# Patient Record
Sex: MALE | Race: WHITE | NOT HISPANIC OR LATINO | URBAN - METROPOLITAN AREA
[De-identification: names, ages, dates, MRNs, and addresses within clinical notes are randomized per-mention and may not be internally consistent; named-entity substitution may affect disease eponyms.]

---

## 2018-01-04 ENCOUNTER — RECORDS - HEALTHEAST (OUTPATIENT)
Dept: LAB | Facility: CLINIC | Age: 58
End: 2018-01-04

## 2018-01-04 LAB
ALBUMIN SERPL-MCNC: 4 G/DL (ref 3.5–5)
ALP SERPL-CCNC: 109 U/L (ref 45–120)
ALT SERPL W P-5'-P-CCNC: 29 U/L (ref 0–45)
ANION GAP SERPL CALCULATED.3IONS-SCNC: 9 MMOL/L (ref 5–18)
AST SERPL W P-5'-P-CCNC: 28 U/L (ref 0–40)
BILIRUB SERPL-MCNC: 0.6 MG/DL (ref 0–1)
BUN SERPL-MCNC: 15 MG/DL (ref 8–22)
CALCIUM SERPL-MCNC: 8.9 MG/DL (ref 8.5–10.5)
CHLORIDE BLD-SCNC: 103 MMOL/L (ref 98–107)
CHOLEST SERPL-MCNC: 142 MG/DL
CO2 SERPL-SCNC: 28 MMOL/L (ref 22–31)
CREAT SERPL-MCNC: 0.85 MG/DL (ref 0.7–1.3)
FASTING STATUS PATIENT QL REPORTED: ABNORMAL
GFR SERPL CREATININE-BSD FRML MDRD: >60 ML/MIN/1.73M2
GLUCOSE BLD-MCNC: 82 MG/DL (ref 70–125)
HDLC SERPL-MCNC: 41 MG/DL
LDLC SERPL CALC-MCNC: 57 MG/DL
POTASSIUM BLD-SCNC: 4.7 MMOL/L (ref 3.5–5)
PROT SERPL-MCNC: 7.3 G/DL (ref 6–8)
PSA SERPL-MCNC: 1 NG/ML (ref 0–3.5)
SODIUM SERPL-SCNC: 140 MMOL/L (ref 136–145)
TRIGL SERPL-MCNC: 219 MG/DL

## 2020-08-04 ENCOUNTER — RECORDS - HEALTHEAST (OUTPATIENT)
Dept: LAB | Facility: CLINIC | Age: 60
End: 2020-08-04

## 2020-08-04 LAB
ALBUMIN SERPL-MCNC: 4.4 G/DL (ref 3.5–5)
ALP SERPL-CCNC: 83 U/L (ref 45–120)
ALT SERPL W P-5'-P-CCNC: 38 U/L (ref 0–45)
ANION GAP SERPL CALCULATED.3IONS-SCNC: 13 MMOL/L (ref 5–18)
AST SERPL W P-5'-P-CCNC: 34 U/L (ref 0–40)
BILIRUB SERPL-MCNC: 0.5 MG/DL (ref 0–1)
BUN SERPL-MCNC: 15 MG/DL (ref 8–22)
CALCIUM SERPL-MCNC: 9.2 MG/DL (ref 8.5–10.5)
CHLORIDE BLD-SCNC: 102 MMOL/L (ref 98–107)
CHOLEST SERPL-MCNC: 159 MG/DL
CO2 SERPL-SCNC: 24 MMOL/L (ref 22–31)
CREAT SERPL-MCNC: 0.85 MG/DL (ref 0.7–1.3)
FASTING STATUS PATIENT QL REPORTED: ABNORMAL
GFR SERPL CREATININE-BSD FRML MDRD: >60 ML/MIN/1.73M2
GLUCOSE BLD-MCNC: 91 MG/DL (ref 70–125)
HDLC SERPL-MCNC: 48 MG/DL
LDLC SERPL CALC-MCNC: 77 MG/DL
POTASSIUM BLD-SCNC: 4.5 MMOL/L (ref 3.5–5)
PROT SERPL-MCNC: 7.5 G/DL (ref 6–8)
PSA SERPL-MCNC: 1.1 NG/ML (ref 0–3.5)
SODIUM SERPL-SCNC: 139 MMOL/L (ref 136–145)
TRIGL SERPL-MCNC: 170 MG/DL

## 2020-08-20 ENCOUNTER — AMBULATORY - HEALTHEAST (OUTPATIENT)
Dept: ADMINISTRATIVE | Facility: CLINIC | Age: 60
End: 2020-08-20

## 2020-08-20 DIAGNOSIS — K40.90 RIGHT INGUINAL HERNIA: ICD-10-CM

## 2020-08-21 ENCOUNTER — AMBULATORY - HEALTHEAST (OUTPATIENT)
Dept: SURGERY | Facility: CLINIC | Age: 60
End: 2020-08-21

## 2020-08-21 ENCOUNTER — OFFICE VISIT - HEALTHEAST (OUTPATIENT)
Dept: SURGERY | Facility: CLINIC | Age: 60
End: 2020-08-21

## 2020-08-21 DIAGNOSIS — K40.90 RIGHT INGUINAL HERNIA: ICD-10-CM

## 2020-08-21 RX ORDER — PANTOPRAZOLE SODIUM 40 MG/1
40 TABLET, DELAYED RELEASE ORAL DAILY
Status: SHIPPED | COMMUNITY
Start: 2020-08-21

## 2020-08-21 ASSESSMENT — MIFFLIN-ST. JEOR: SCORE: 1731.18

## 2020-08-24 ENCOUNTER — COMMUNICATION - HEALTHEAST (OUTPATIENT)
Dept: SURGERY | Facility: CLINIC | Age: 60
End: 2020-08-24

## 2020-08-25 ENCOUNTER — AMBULATORY - HEALTHEAST (OUTPATIENT)
Dept: SURGERY | Facility: AMBULATORY SURGERY CENTER | Age: 60
End: 2020-08-25

## 2020-08-25 DIAGNOSIS — Z11.59 ENCOUNTER FOR SCREENING FOR OTHER VIRAL DISEASES: ICD-10-CM

## 2020-09-22 ENCOUNTER — AMBULATORY - HEALTHEAST (OUTPATIENT)
Dept: FAMILY MEDICINE | Facility: CLINIC | Age: 60
End: 2020-09-22

## 2020-09-22 DIAGNOSIS — Z11.59 ENCOUNTER FOR SCREENING FOR OTHER VIRAL DISEASES: ICD-10-CM

## 2020-09-23 ASSESSMENT — MIFFLIN-ST. JEOR
SCORE: 1726.65
SCORE: 1726.65

## 2020-09-24 ENCOUNTER — ANESTHESIA - HEALTHEAST (OUTPATIENT)
Dept: SURGERY | Facility: AMBULATORY SURGERY CENTER | Age: 60
End: 2020-09-24

## 2020-09-24 ENCOUNTER — COMMUNICATION - HEALTHEAST (OUTPATIENT)
Dept: SCHEDULING | Facility: CLINIC | Age: 60
End: 2020-09-24

## 2020-09-25 ENCOUNTER — SURGERY - HEALTHEAST (OUTPATIENT)
Dept: SURGERY | Facility: AMBULATORY SURGERY CENTER | Age: 60
End: 2020-09-25

## 2020-09-25 ENCOUNTER — HOSPITAL ENCOUNTER (OUTPATIENT)
Dept: SURGERY | Facility: AMBULATORY SURGERY CENTER | Age: 60
Discharge: HOME | End: 2020-09-25
Attending: SPECIALIST | Admitting: SPECIALIST

## 2020-09-25 DIAGNOSIS — K40.90 RIGHT INGUINAL HERNIA: ICD-10-CM

## 2020-09-25 ASSESSMENT — MIFFLIN-ST. JEOR
SCORE: 1726.65
SCORE: 1726.65

## 2020-10-12 ENCOUNTER — OFFICE VISIT - HEALTHEAST (OUTPATIENT)
Dept: SURGERY | Facility: CLINIC | Age: 60
End: 2020-10-12

## 2020-10-12 DIAGNOSIS — K40.90 RIGHT INGUINAL HERNIA: ICD-10-CM

## 2021-06-04 VITALS
SYSTOLIC BLOOD PRESSURE: 136 MMHG | HEIGHT: 69 IN | WEIGHT: 203 LBS | DIASTOLIC BLOOD PRESSURE: 86 MMHG | BODY MASS INDEX: 30.07 KG/M2

## 2021-06-05 VITALS
HEIGHT: 69 IN | BODY MASS INDEX: 29.92 KG/M2 | WEIGHT: 202 LBS | WEIGHT: 202 LBS | HEIGHT: 69 IN | BODY MASS INDEX: 29.92 KG/M2

## 2021-06-10 NOTE — TELEPHONE ENCOUNTER
Left a voicemail for Byron to schedule his surgery. He did mention he wanted September 25th. Informed him that day is available. Provided direct line to call back to schedule.    Anamaria KAUR [facility], General Surgery  Surgery Scheduler  [phone] (Direct Line)  [phone] (Main Line)

## 2021-06-10 NOTE — TELEPHONE ENCOUNTER
Spoke with Yoon. Went over surgery instructions. He would like a copy emailed to him at [email or web address] Copilot:    We have your laparoscopic right inguinal hernia repair with Dr. Hewitt arranged as follows:     Surgery Date: Friday September 25th    Location: [facility]                 3rd Floor, [facility]                  [address] [place] [id number]     Arrival Time: 6:30 AM (unless instructed otherwise by the preop nurse)    Prep:     1. Schedule a preop physical with your primary care doctor. This may be virtual or face-to-face depending on your doctors preference. Call them right away to schedule this.    2. COVID19 testing is required within 4 days of surgery. We have this scheduled for you at our [facility] Testing Location ([address] [place] [address]) on Tuesday Sept. 22nd at 4:00 PM. Please have your ID ready and your face mask on.     3. Nothing to eat or drink for 8 hours before surgery unless instructed differently by the preop nurse.    4. No blood thinners including aspirin for one week prior to surgery. Verify this is safe for you with your primary care doctor before stopping.     5. You need an adult to drive you home and stay with you 24 hours after surgery. Because of COVID19 related visitor restrictions, surgical patients are allowed one visitor only during the preoperative phase of surgery.    6. When you arrive to the facility, you will be screened for COVID19 symptoms. If you screen positive, your surgery will need to be postponed for your safety.    7. If the community sees a new surge in COVID19 hospital admissions, your procedure may need to be postponed. We will contact you if this happens.    8. We always encourage you to notify your insurance any time you have something scheduled including surgery. The number is usually right on the back of your insurance card. Procedure Code: [id number]    Call our office if you have  any questions! Thank you!     Anamaria KAUR [facility], General Surgery  Surgery Scheduler  [phone] (Direct Line)  [phone] (Main Line)

## 2021-06-11 NOTE — ANESTHESIA CARE TRANSFER NOTE
Last vitals:   Vitals:    09/25/20 0801   BP: 118/64   Pulse: 82   Resp: 16   Temp: 36.7  C (98.1  F)   SpO2: 93%     Patient's level of consciousness is drowsy  Spontaneous respirations: yes  Maintains airway independently: no: #9 oral airway in place  Dentition unchanged: yes  Oropharynx: oral airway in place    QCDR Measures:  ASA# 20 - Surgical Safety Checklist: WHO surgical safety checklist completed prior to induction    PQRS# 430 - Adult PONV Prevention: 4558F - Pt received => 2 anti-emetic agents (different classes) preop & intraop  ASA# 8 - Peds PONV Prevention: 4558F - Pt received => 2 anti-emetic agents (different classes) preop & intraop  PQRS# 424 - Serina-op Temp Management: 4559F - At least one body temp DOCUMENTED => 35.5C or 95.9F within required timeframe  PQRS# 426 - PACU Transfer Protocol: - Transfer of care checklist used  ASA# 14 - Acute Post-op Pain: ASA14B - Patient did NOT experience pain >= 7 out of 10

## 2021-06-11 NOTE — ANESTHESIA POSTPROCEDURE EVALUATION
Patient: Byron Fabian  Procedure(s):  HERNIORRHAPHY, INGUINAL, LAPAROSCOPIC (Right)  Anesthesia type: general    Patient location: Phase II Recovery  Last vitals:   Vitals Value Taken Time   /73 9/25/2020  8:45 AM   Temp 36.2  C (97.2  F) 9/25/2020  8:25 AM   Pulse 77 9/25/2020  8:50 AM   Resp 16 9/25/2020  8:30 AM   SpO2 91 % 9/25/2020  8:50 AM   Vitals shown include unvalidated device data.  Post vital signs: stable  Level of consciousness: awake and responds to simple questions  Post-anesthesia pain: pain controlled  Post-anesthesia nausea and vomiting: no  Pulmonary: unassisted, return to baseline  Cardiovascular: stable and blood pressure at baseline  Hydration: adequate  Anesthetic events: no    QCDR Measures:  ASA# 11 - Serina-op Cardiac Arrest: ASA11B - Patient did NOT experience unanticipated cardiac arrest  ASA# 12 - Serina-op Mortality Rate: ASA12B - Patient did NOT die  ASA# 13 - PACU Re-Intubation Rate: ASA13B - Patient did NOT require a new airway mgmt  ASA# 10 - Composite Anes Safety: ASA10A - No serious adverse event    Additional Notes:

## 2021-06-11 NOTE — OP NOTE
Operative Note    Name:  Byron Fabian  PCP:  Rolo Hernandez MD  Procedure Date:  9/25/2020      HERNIORRHAPHY, INGUINAL, LAPAROSCOPIC (Right)    Pre-Procedure Diagnosis:  Right inguinal hernia [K40.90]     Post-Procedure Diagnosis:    same    Surgeon(s):  Giacomo Hewitt MD    No Physician Assistant or First Assist has been documented in procedure    Anesthesia Type:  general    Past Medical History:   Diagnosis Date     Biliary tract cancer (H)      Hyperlipidemia      Sleep apnea     Does not have to use CPAP     Testicular cancer (H)        Patient Active Problem List    Diagnosis Date Noted     Right inguinal hernia 08/25/2020       Findings:  Right large indirect    Operative Report:    Consent was obtained and the patient was taken to the operating room.  Gen. anesthesia was administered by the anesthesia staff.  The briefing and timeout was performed by the OR staff.  The patient was prepped and draped in a sterile manner after a Sanchez was placed.  The incision was made opposite the hernia at the umbilicus.  This was taken down through the anterior fascia of the rectus sheath.  A dissecting port was placed to the pubic tubercle under direct visualization.  This was dilated to its full diameter under direct visualization and removed.  An operating scope was placed in the preperitoneal space was insufflated to a pressure of 15 mmHg of CO2.  Two 5 mm trochars were placed on the midline under direct visualization of the scope.  The landmarks of the pelvis were identified the pubic tubercle, Arron's ligament and the cord and vascular structures.  The hernia sac was reduced internally.  This was done with blunt and sharp dissection.  3D max mesh was rolled and placed in the preperitoneal space covering the pubic tubercle, Arron's ligament, the cord and vascular structures.  20 mL of Marcaine was injected in the space.  The mesh lied in good position and the CO2 and trochars were removed.  The anterior  abdominal wall defect was closed with a 0 Vicryl figure-of-eight suture.  All the incisions were closed with 4-0 Monocryl suture in a subcuticular fashion.  Steri-Strips and sterile dressings and 10 mL of Marcaine were injected in the incisions.  30 mg of Toradol were given IV by anesthesia.  The patient was extubated, the Sanchez removed and transferred to the PACU in stable condition.  All sponge and needle counts are correct.    Estimated Blood Loss:   5 ml    Specimens:    none       Drains:   None    Complications:    None    Giacomo Hewitt     Date: 9/25/2020  Time: 8:14 AM

## 2021-06-12 NOTE — PROGRESS NOTES
HPI: Pt is here for follow up of a status post laparoscopic right inguinal hernia repair.  He is doing well. His appetite is good, and bowel function regular.  No fevers or chills. Ambulating without problems.       There were no vitals taken for this visit.      EXAM: This is a  59 y.o. MAN in no distress  GENERAL: Appears well  CHEST clear  CVS S1S2 NSR  ABDOMEN: Soft, non-tender.  Incision is healed nicely no signs of infection or problems.  EXT: warm, moves without difficulty      Assessment/Plan:   Status post laparoscopic right inguinal hernia repair  Doing well post procedure no major complaints problems or issues.  Denies pain and is getting back to normal activities.  Call for questions concerns or issues    Giacomo Hewitt MD  [facility] Department of Surgery

## 2021-06-16 PROBLEM — K40.90 RIGHT INGUINAL HERNIA: Status: ACTIVE | Noted: 2020-08-25

## 2021-06-20 NOTE — LETTER
Letter by Giacomo Hewitt MD at      Author: Giacomo Hewitt MD Service: -- Author Type: --    Filed:  Encounter Date: 10/12/2020 Status: (Other)         Rolo Hernandez MD  [address] [place] [id number]                                  October 12, 2020    Patient: Byron Fabian   MR Number: [id number]   YOB: 1960   Date of Visit: 10/12/2020     Dear Dr. Mary MD:    Thank you for referring Byron Fabian to me for evaluation. Below are the relevant portions of my assessment and plan of care.    If you have questions, please do not hesitate to call me. I look forward to following Byron along with you.    Sincerely,        Giacomo Hewitt MD          CC  No Recipients  Giacomo Hewitt MD  10/12/2020 12:12 PM  Sign when Signing Visit  HPI: Pt is here for follow up of a status post laparoscopic right inguinal hernia repair.  He is doing well. His appetite is good, and bowel function regular.  No fevers or chills. Ambulating without problems.       There were no vitals taken for this visit.      EXAM: This is a  59 y.o. MAN in no distress  GENERAL: Appears well  CHEST clear  CVS S1S2 NSR  ABDOMEN: Soft, non-tender.  Incision is healed nicely no signs of infection or problems.  EXT: warm, moves without difficulty      Assessment/Plan:   Status post laparoscopic right inguinal hernia repair  Doing well post procedure no major complaints problems or issues.  Denies pain and is getting back to normal activities.  Call for questions concerns or issues    Giacomo Hewitt MD  [facility] Department of Surgery

## 2021-07-03 NOTE — ANESTHESIA PREPROCEDURE EVALUATION
Anesthesia Preprocedure Evaluation by Sebastien Narayanan MD at 9/25/2020  6:47 AM     Author: Sebastien Narayanan MD Service: -- Author Type: Physician    Filed: 9/25/2020  6:48 AM Date of Service: 9/25/2020  6:47 AM Status: Signed    : Sebastien Narayanan MD (Physician)       Anesthesia Evaluation      Patient summary reviewed   No history of anesthetic complications     Airway   Mallampati: II  Neck ROM: full   Pulmonary - normal exam   (-) sleep apnea (resolved after 50 lb weight loss), not a smoker                         Cardiovascular - negative ROS and normal exam  Exercise tolerance: > or = 4 METS  (+) , hypercholesterolemia,      Neuro/Psych - negative ROS     Endo/Other - negative ROS      GI/Hepatic/Renal      Comments: S/p whipple for pancreatic ca 2015          Dental    (+) chipped                           Anesthesia Plan  Planned anesthetic: general endotracheal  Versed/fentanyl/propofol/reji  Ketamine PRN  Decadron/zofran    ASA 2   Induction: intravenous   Anesthetic plan and risks discussed with: patient  Anesthesia plan special considerations: antiemetics,   Post-op plan: routine recovery      Results for orders placed or performed in visit on 09/22/20   COVID-19 Virus PCR MRF    Specimen: Respiratory   Result Value Ref Range    COVID-19 VIRUS SPECIMEN SOURCE Nasopharyngeal     2019-nCOV Not Detected        Chemistry        Component Value Date/Time     08/04/2020 1645    K 4.5 08/04/2020 1645     08/04/2020 1645    CO2 24 08/04/2020 1645    BUN 15 08/04/2020 1645    CREATININE 0.85 08/04/2020 1645    GLU 91 08/04/2020 1645        Component Value Date/Time    CALCIUM 9.2 08/04/2020 1645    ALKPHOS 83 08/04/2020 1645    AST 34 08/04/2020 1645    ALT 38 08/04/2020 1645    BILITOT 0.5 08/04/2020 1645

## 2021-07-03 NOTE — INTERVAL H&P NOTE
Interval H&P Note by Giacomo Hewitt MD at 9/25/2020  6:18 AM     Author: Giacomo Hewitt MD Service: Surgery Author Type: Physician    Filed: 9/25/2020  7:02 AM Date of Service: 9/25/2020  6:18 AM Status: Signed    : Giacomo Hewitt MD (Physician)       I have performed an assessment and examined the patient, as necessary, to update the patient's current status that may have changed since the prior History and Physical.  The History & Physical has been reviewed and no updates are needed.          Giacomo Hewitt MD    [email or web address]  General Surgery [phone]  Vascular Surgery [phone]             Source Note     Author: Epic, User Service: -- Author Type: Registered Nurse    Filed: 9/23/2020  1:48 PM Date of Service: 9/25/2020  7:00 AM Status: Signed    : Anjali Mcnally

## 2023-09-01 ENCOUNTER — LAB REQUISITION (OUTPATIENT)
Dept: LAB | Facility: CLINIC | Age: 63
End: 2023-09-01

## 2023-09-01 DIAGNOSIS — E78.5 HYPERLIPIDEMIA, UNSPECIFIED: ICD-10-CM

## 2023-09-01 LAB
ALBUMIN SERPL BCG-MCNC: 4.4 G/DL (ref 3.5–5.2)
ALP SERPL-CCNC: 85 U/L (ref 40–129)
ALT SERPL W P-5'-P-CCNC: 34 U/L (ref 0–70)
ANION GAP SERPL CALCULATED.3IONS-SCNC: 12 MMOL/L (ref 7–15)
AST SERPL W P-5'-P-CCNC: 36 U/L (ref 0–45)
BILIRUB SERPL-MCNC: 0.4 MG/DL
BUN SERPL-MCNC: 10.8 MG/DL (ref 8–23)
CALCIUM SERPL-MCNC: 8.9 MG/DL (ref 8.8–10.2)
CHLORIDE SERPL-SCNC: 106 MMOL/L (ref 98–107)
CHOLEST SERPL-MCNC: 140 MG/DL
CREAT SERPL-MCNC: 0.93 MG/DL (ref 0.67–1.17)
DEPRECATED HCO3 PLAS-SCNC: 24 MMOL/L (ref 22–29)
GFR SERPL CREATININE-BSD FRML MDRD: >90 ML/MIN/1.73M2
GLUCOSE SERPL-MCNC: 106 MG/DL (ref 70–99)
HDLC SERPL-MCNC: 48 MG/DL
LDLC SERPL CALC-MCNC: 66 MG/DL
NONHDLC SERPL-MCNC: 92 MG/DL
POTASSIUM SERPL-SCNC: 4.2 MMOL/L (ref 3.4–5.3)
PROT SERPL-MCNC: 6.7 G/DL (ref 6.4–8.3)
PSA SERPL DL<=0.01 NG/ML-MCNC: 0.95 NG/ML (ref 0–4.5)
SODIUM SERPL-SCNC: 142 MMOL/L (ref 136–145)
TRIGL SERPL-MCNC: 132 MG/DL

## 2023-09-01 PROCEDURE — 80053 COMPREHEN METABOLIC PANEL: CPT | Performed by: FAMILY MEDICINE

## 2023-09-01 PROCEDURE — G0103 PSA SCREENING: HCPCS | Performed by: FAMILY MEDICINE

## 2023-09-01 PROCEDURE — 80061 LIPID PANEL: CPT | Performed by: FAMILY MEDICINE

## 2024-09-10 ENCOUNTER — LAB REQUISITION (OUTPATIENT)
Dept: LAB | Facility: CLINIC | Age: 64
End: 2024-09-10

## 2024-09-10 DIAGNOSIS — Z12.5 ENCOUNTER FOR SCREENING FOR MALIGNANT NEOPLASM OF PROSTATE: ICD-10-CM

## 2024-09-10 PROCEDURE — G0103 PSA SCREENING: HCPCS | Performed by: FAMILY MEDICINE

## 2024-09-11 LAB — PSA SERPL DL<=0.01 NG/ML-MCNC: 1.44 NG/ML (ref 0–4.5)
